# Patient Record
Sex: MALE | Race: BLACK OR AFRICAN AMERICAN | NOT HISPANIC OR LATINO | Employment: FULL TIME | URBAN - METROPOLITAN AREA
[De-identification: names, ages, dates, MRNs, and addresses within clinical notes are randomized per-mention and may not be internally consistent; named-entity substitution may affect disease eponyms.]

---

## 2023-12-05 DIAGNOSIS — Z00.00 HEALTH MAINTENANCE EXAMINATION: ICD-10-CM

## 2024-01-26 ENCOUNTER — HOSPITAL ENCOUNTER (OUTPATIENT)
Dept: VASCULAR MEDICINE | Facility: HOSPITAL | Age: 64
Discharge: HOME | End: 2024-01-26
Payer: COMMERCIAL

## 2024-01-26 ENCOUNTER — OFFICE VISIT (OUTPATIENT)
Dept: PRIMARY CARE | Facility: CLINIC | Age: 64
End: 2024-01-26
Payer: COMMERCIAL

## 2024-01-26 ENCOUNTER — HOSPITAL ENCOUNTER (OUTPATIENT)
Dept: RADIOLOGY | Facility: HOSPITAL | Age: 64
Discharge: HOME | End: 2024-01-26
Payer: COMMERCIAL

## 2024-01-26 ENCOUNTER — NUTRITION (OUTPATIENT)
Dept: PRIMARY CARE | Facility: CLINIC | Age: 64
End: 2024-01-26
Payer: COMMERCIAL

## 2024-01-26 ENCOUNTER — ALLIED HEALTH (OUTPATIENT)
Dept: INTEGRATIVE MEDICINE | Facility: CLINIC | Age: 64
End: 2024-01-26
Payer: COMMERCIAL

## 2024-01-26 ENCOUNTER — HOSPITAL ENCOUNTER (OUTPATIENT)
Dept: CARDIOLOGY | Facility: HOSPITAL | Age: 64
Discharge: HOME | End: 2024-01-26
Payer: COMMERCIAL

## 2024-01-26 VITALS
SYSTOLIC BLOOD PRESSURE: 116 MMHG | HEART RATE: 74 BPM | BODY MASS INDEX: 26.9 KG/M2 | HEIGHT: 73 IN | OXYGEN SATURATION: 97 % | WEIGHT: 203 LBS | DIASTOLIC BLOOD PRESSURE: 78 MMHG

## 2024-01-26 VITALS — HEIGHT: 73 IN | BODY MASS INDEX: 26.9 KG/M2 | WEIGHT: 203 LBS

## 2024-01-26 DIAGNOSIS — Z00.00 HEALTH MAINTENANCE EXAMINATION: ICD-10-CM

## 2024-01-26 DIAGNOSIS — Z00.00 HEALTH MAINTENANCE EXAMINATION: Primary | ICD-10-CM

## 2024-01-26 DIAGNOSIS — R09.89 OTHER SPECIFIED SYMPTOMS AND SIGNS INVOLVING THE CIRCULATORY AND RESPIRATORY SYSTEMS: ICD-10-CM

## 2024-01-26 DIAGNOSIS — Z00.00 HEALTHCARE MAINTENANCE: Primary | ICD-10-CM

## 2024-01-26 DIAGNOSIS — Z13.6 ENCOUNTER FOR SCREENING FOR CARDIOVASCULAR DISORDERS: ICD-10-CM

## 2024-01-26 LAB
25(OH)D3 SERPL-MCNC: 56 NG/ML (ref 30–100)
ALBUMIN SERPL BCP-MCNC: 4.3 G/DL (ref 3.4–5)
ALP SERPL-CCNC: 81 U/L (ref 33–136)
ALT SERPL W P-5'-P-CCNC: 30 U/L (ref 10–52)
ANION GAP SERPL CALC-SCNC: 12 MMOL/L (ref 10–20)
AST SERPL W P-5'-P-CCNC: 25 U/L (ref 9–39)
BASOPHILS # BLD AUTO: 0.03 X10*3/UL (ref 0–0.1)
BASOPHILS NFR BLD AUTO: 0.7 %
BILIRUB SERPL-MCNC: 0.8 MG/DL (ref 0–1.2)
BUN SERPL-MCNC: 15 MG/DL (ref 6–23)
CALCIUM SERPL-MCNC: 9.6 MG/DL (ref 8.6–10.3)
CHLORIDE SERPL-SCNC: 102 MMOL/L (ref 98–107)
CHOLEST SERPL-MCNC: 240 MG/DL (ref 0–199)
CHOLESTEROL/HDL RATIO: 4.6
CO2 SERPL-SCNC: 30 MMOL/L (ref 21–32)
CREAT SERPL-MCNC: 1.01 MG/DL (ref 0.5–1.3)
CRP SERPL HS-MCNC: 3 MG/L
EGFRCR SERPLBLD CKD-EPI 2021: 84 ML/MIN/1.73M*2
EOSINOPHIL # BLD AUTO: 0.12 X10*3/UL (ref 0–0.7)
EOSINOPHIL NFR BLD AUTO: 2.7 %
ERYTHROCYTE [DISTWIDTH] IN BLOOD BY AUTOMATED COUNT: 11.5 % (ref 11.5–14.5)
EST. AVERAGE GLUCOSE BLD GHB EST-MCNC: 91 MG/DL
FERRITIN SERPL-MCNC: 120 NG/ML (ref 20–300)
GLUCOSE SERPL-MCNC: 95 MG/DL (ref 74–99)
HBA1C MFR BLD: 4.8 %
HCT VFR BLD AUTO: 45.6 % (ref 41–52)
HDLC SERPL-MCNC: 52.1 MG/DL
HGB BLD-MCNC: 14.4 G/DL (ref 13.5–17.5)
IMM GRANULOCYTES # BLD AUTO: 0 X10*3/UL (ref 0–0.7)
IMM GRANULOCYTES NFR BLD AUTO: 0 % (ref 0–0.9)
INSULIN P FAST SERPL-ACNC: 10 UIU/ML (ref 3–25)
IRON SATN MFR SERPL: 36 % (ref 25–45)
IRON SERPL-MCNC: 133 UG/DL (ref 35–150)
LDLC SERPL CALC-MCNC: 166 MG/DL
LYMPHOCYTES # BLD AUTO: 2.41 X10*3/UL (ref 1.2–4.8)
LYMPHOCYTES NFR BLD AUTO: 54 %
MAGNESIUM SERPL-MCNC: 2.2 MG/DL (ref 1.6–2.4)
MCH RBC QN AUTO: 31.4 PG (ref 26–34)
MCHC RBC AUTO-ENTMCNC: 31.6 G/DL (ref 32–36)
MCV RBC AUTO: 100 FL (ref 80–100)
MONOCYTES # BLD AUTO: 0.44 X10*3/UL (ref 0.1–1)
MONOCYTES NFR BLD AUTO: 9.9 %
NEUTROPHILS # BLD AUTO: 1.46 X10*3/UL (ref 1.2–7.7)
NEUTROPHILS NFR BLD AUTO: 32.7 %
NON HDL CHOLESTEROL: 188 MG/DL (ref 0–149)
NRBC BLD-RTO: 0 /100 WBCS (ref 0–0)
PLATELET # BLD AUTO: 332 X10*3/UL (ref 150–450)
POTASSIUM SERPL-SCNC: 5 MMOL/L (ref 3.5–5.3)
PROT SERPL-MCNC: 7.8 G/DL (ref 6.4–8.2)
PSA SERPL-MCNC: 1.27 NG/ML
RBC # BLD AUTO: 4.58 X10*6/UL (ref 4.5–5.9)
SODIUM SERPL-SCNC: 139 MMOL/L (ref 136–145)
TIBC SERPL-MCNC: 367 UG/DL (ref 240–445)
TRIGL SERPL-MCNC: 109 MG/DL (ref 0–149)
TSH SERPL-ACNC: 2.78 MIU/L (ref 0.44–3.98)
UIBC SERPL-MCNC: 234 UG/DL (ref 110–370)
URATE SERPL-MCNC: 4.4 MG/DL (ref 4–7.5)
VIT B12 SERPL-MCNC: 698 PG/ML (ref 211–911)
VLDL: 22 MG/DL (ref 0–40)
WBC # BLD AUTO: 4.5 X10*3/UL (ref 4.4–11.3)

## 2024-01-26 PROCEDURE — 82728 ASSAY OF FERRITIN: CPT

## 2024-01-26 PROCEDURE — 93016 CV STRESS TEST SUPVJ ONLY: CPT | Performed by: INTERNAL MEDICINE

## 2024-01-26 PROCEDURE — SMAR2 SMART-UHCSM: Performed by: PHYSICIAN ASSISTANT

## 2024-01-26 PROCEDURE — 83525 ASSAY OF INSULIN: CPT

## 2024-01-26 PROCEDURE — 71046 X-RAY EXAM CHEST 2 VIEWS: CPT | Mod: FOREIGN READ | Performed by: RADIOLOGY

## 2024-01-26 PROCEDURE — 75571 CT HRT W/O DYE W/CA TEST: CPT

## 2024-01-26 PROCEDURE — 83540 ASSAY OF IRON: CPT

## 2024-01-26 PROCEDURE — 83550 IRON BINDING TEST: CPT

## 2024-01-26 PROCEDURE — 93880 EXTRACRANIAL BILAT STUDY: CPT | Mod: 52

## 2024-01-26 PROCEDURE — 76706 US ABDL AORTA SCREEN AAA: CPT

## 2024-01-26 PROCEDURE — 86141 C-REACTIVE PROTEIN HS: CPT

## 2024-01-26 PROCEDURE — 84443 ASSAY THYROID STIM HORMONE: CPT

## 2024-01-26 PROCEDURE — 85025 COMPLETE CBC W/AUTO DIFF WBC: CPT

## 2024-01-26 PROCEDURE — 84550 ASSAY OF BLOOD/URIC ACID: CPT

## 2024-01-26 PROCEDURE — 82172 ASSAY OF APOLIPOPROTEIN: CPT

## 2024-01-26 PROCEDURE — 76706 US ABDL AORTA SCREEN AAA: CPT | Performed by: INTERNAL MEDICINE

## 2024-01-26 PROCEDURE — 83036 HEMOGLOBIN GLYCOSYLATED A1C: CPT

## 2024-01-26 PROCEDURE — 82607 VITAMIN B-12: CPT

## 2024-01-26 PROCEDURE — 83735 ASSAY OF MAGNESIUM: CPT

## 2024-01-26 PROCEDURE — 84402 ASSAY OF FREE TESTOSTERONE: CPT

## 2024-01-26 PROCEDURE — 93017 CV STRESS TEST TRACING ONLY: CPT

## 2024-01-26 PROCEDURE — 71046 X-RAY EXAM CHEST 2 VIEWS: CPT

## 2024-01-26 PROCEDURE — 36415 COLL VENOUS BLD VENIPUNCTURE: CPT

## 2024-01-26 PROCEDURE — 84153 ASSAY OF PSA TOTAL: CPT

## 2024-01-26 PROCEDURE — 93880 EXTRACRANIAL BILAT STUDY: CPT | Mod: REDUCED SERVICES | Performed by: INTERNAL MEDICINE

## 2024-01-26 PROCEDURE — EXAM4 EXAM 4: Performed by: INTERNAL MEDICINE

## 2024-01-26 PROCEDURE — 80061 LIPID PANEL: CPT

## 2024-01-26 PROCEDURE — 93018 CV STRESS TEST I&R ONLY: CPT | Performed by: INTERNAL MEDICINE

## 2024-01-26 PROCEDURE — NUTCO NUTRITION CONSULTATION: Performed by: DIETITIAN, REGISTERED

## 2024-01-26 PROCEDURE — 82306 VITAMIN D 25 HYDROXY: CPT

## 2024-01-26 PROCEDURE — 80053 COMPREHEN METABOLIC PANEL: CPT

## 2024-01-26 RX ORDER — PHENYLEPHRINE HCL 10 MG
500 TABLET ORAL DAILY
COMMUNITY

## 2024-01-26 RX ORDER — TURMERIC/TURMERIC EXT/PEPR EXT 900-100 MG
CAPSULE ORAL
COMMUNITY

## 2024-01-26 RX ORDER — VIT C/E/ZN/COPPR/LUTEIN/ZEAXAN 250MG-90MG
CAPSULE ORAL
COMMUNITY

## 2024-01-26 RX ORDER — IBUPROFEN 100 MG/5ML
SUSPENSION, ORAL (FINAL DOSE FORM) ORAL
COMMUNITY

## 2024-01-26 RX ORDER — LANOLIN ALCOHOL/MO/W.PET/CERES
CREAM (GRAM) TOPICAL
COMMUNITY

## 2024-01-26 RX ORDER — BETA-CAROTENE 7500 MCG
CAPSULE ORAL
COMMUNITY

## 2024-01-26 RX ORDER — EPINEPHRINE 0.22MG
100 AEROSOL WITH ADAPTER (ML) INHALATION
COMMUNITY

## 2024-01-26 NOTE — PATIENT INSTRUCTIONS
1) To promote weight loss, which should also keep lipid profile and glucose tolerance in line, try to stick to a 8164-3391 calorie a day meal plan.     2) To help lower cholesterol levels, begin to prioritize your fiber intake and aim for a goal of 35-40g fiber per day.  Be sure to include a wide variety of vegetables each day, at both lunch and dinner.  Other great high fiber foods include beans, lentils, berries, michoacano seeds, and flax seeds.    3) Incorporate foods high in soluble fiber such as: black beans, kidney beans, [place] sprouts, sweet potatoes, broccoli, turnips, carrots, avocado, pears, apricots, nectarines, apples, flax seeds, sunflower seeds, hazelnuts, oats.  Aim for 10-15g soluble fiber per day.    4) Instead of the ONE protein bar, consider switching to plant based bars such as Aloha or No Cow which are both high in protein and fiber with clean ingredient lists.    5) When making oatmeal for dinner, be sure to add a 1.5-2 scoops of protein powder such as Ka'Javier, Sultan, OWYN, or Garden of Life.  When making eggs for dinner, combine these with a variety of vegetables and top with avocado for added fiber and healthy fats.    6) Recommended supplements:  -Pure Encapsulations O.N.E. Multivitamin  -Nordic Naturals Algae Omega  -Pure Encapsulations magnesium citrate (to help with constipation), can take up to 500mg at bedtime    7) Consider using pre-made meals that can be delivered to your home to offer more variety in your diet.  Recommended companies: Unrefined MALINDA, Factor, Trifecta.  All of these options offer vegetarian/vegan meal options.

## 2024-01-26 NOTE — PROGRESS NOTES
"Nutrition Assessment     Reason for Visit: It was a pleasure speaking with Mr. Castano today to discuss diet and nutrition as part of his executive physical follow-up.      Anthropometrics:  Anthropometrics  Height: 185.4 cm (6' 1\")  Weight: 92.1 kg (203 lb)  BMI (Calculated): 26.79  DBW: 180lbs     Food And Nutrient Intake:  Food and Nutrient History  Food and Nutrient History: He follows a vegetarian diet.  His protein sources include tofu, beans, lentils, eggs 1-2 times a week,  He reports eating 2 meals a day.  He generally skips breakfast.  He is not usually hungry at that time and is \"too busy\" to eat this meal.  He gets his lunch from the cafeteria at work during the work week.  He either gets soup-lentil, vegetable, tomato, or a salad from the salad bar that includes tofu, nuts, sometimes egg, carrots, banana peppers, balsamic vinaigrette dressing. On the weekends he may just have pretzels and a diet coke for lunch.  Sometimes he snacks on pretzels in the afternoon. Sometimes he will incorporate a ONE protein bar in the afternoon.  He may include a second ONE protein bar around dinner time (as part of dinner or after dinner).  His typical dinner is a bowl of oatmeal or eggs. If he goes out to eat for dinner he will order salads.  Food Allergy: None  Food Intolerance: None  GI Symptoms: constipation (Since he got back from [facility] he has not been as consistent with his bowel movements.)  Sleep Duration/Quality:  (7 hours a night.  No issues falling asleep.  He gets up to use the bathroom 1-2 times a night.  He falls back to sleep easily.)     Supplements: magnesium, turmeric, Vitamin D, Vitamin C, cinnamon, multivitamin, CoQ10    24 Hour Food Recall  B: skipped  L: salad-tofu, sunflower seeds, iceberg lettuce, carrots, hardboiled egg, banana peppers, balsamic vinaigrette dressing  S: ONE protein bar  D: work function-potatoes, asparagus, vegetables in a flaky crust  S: ONE protein bar    Physical " "Activities:  Physical Activity  Physical Activity History: About 4-5 months ago, he has been experiencing more knee pain where it was even difficult to walk.  He stopped working out to let it recover.  Type of Physical Activity: He just got back to exercising a week ago.  3-4 days a week, 1 hour of cardio, some strength training ~20-30 minutes.    Energy Needs  Calculated Energy Needs Using Equations  Height: 185.4 cm (6' 1\")  Weight Used for Equation Calculations: 92.1 kg (203 lb)  [employer] Equation (Overweight or Obese Patients): 1770  Equation Chosen to Use by RD: Amiato  Activity Factor: 1.4  Total Energy Needs: 2481  Calories for weight loss: 7832-7074  Protein Needs: 125g/day (0.7g/lb DBW, 180lbs)    Nutrition Diagnosis      Nutrition Diagnosis  Patient has Nutrition Diagnosis: Yes  Diagnosis Status (1): New  Nutrition Diagnosis 1: Altered nutrition related to laboratory values  Related to (1): dietary and physiological factors  As Evidenced by (1): elevated cholesterol (240mg/dl), elevated LDL cholesterol (166mg/dl)  Additional Nutrition Diagnosis: Diagnosis 3  Diagnosis Status (2): New  Nutrition Diagnosis 2: Inadequate fiber intake  Related to (2): food- and nutrition-related knowledge deficit regarding appropriate fiber intake  As Evidenced by (2): food recall showing he is not meeting the recommended 35-40g fiber per day  Diagnosis Status (3): New  Nutrition Diagnosis 3: Inadequate protein intake  Related to (3): food- and nutrition-related knowledge deficit regarding appropriate protein intake  As Evidenced by (3): food recall showing he is not meeting the recommended 125g protein per day    Nutrition Interventions/Recommendations   Food and Nutrition Delivery  Meals & Snacks: Energy-modified diet, Fiber-modified diet, Protein-modified diet        Patient Instructions   1) To promote weight loss, which should also keep lipid profile and glucose tolerance in line, try to stick to a " 1544-3541 calorie a day meal plan.     2) To help lower cholesterol levels, begin to prioritize your fiber intake and aim for a goal of 35-40g fiber per day.  Be sure to include a wide variety of vegetables each day, at both lunch and dinner.  Other great high fiber foods include beans, lentils, berries, michoacano seeds, and flax seeds.    3) Incorporate foods high in soluble fiber such as: black beans, kidney beans, [place] sprouts, sweet potatoes, broccoli, turnips, carrots, avocado, pears, apricots, nectarines, apples, flax seeds, sunflower seeds, hazelnuts, oats.  Aim for 10-15g soluble fiber per day.    4) Instead of the ONE protein bar, consider switching to plant based bars such as Aloha or No Cow which are both high in protein and fiber with clean ingredient lists.    5) When making oatmeal for dinner, be sure to add a 1.5-2 scoops of protein powder such as Ka'Javier, [place], OWYN, or Garden of Life.  When making eggs for dinner, combine these with a variety of vegetables and top with avocado for added fiber and healthy fats.    6) Recommended supplements:  -Pure Encapsulations O.N.E. Multivitamin  -Nordic Naturals Algae Omega  -Pure Encapsulations magnesium citrate (to help with constipation), can take up to 500mg at bedtime    7) Consider using pre-made meals that can be delivered to your home to offer more variety in your diet.  Recommended companies: Tip NetworkfinTalentSpring, Factor, Welliko.  All of these options offer vegetarian/vegan meal options.    Nutrition Monitoring and Evaluation   Food/Nutrient Related History Monitoring  Monitoring and Evaluation Plan: Energy intake, Protein intake, Fiber intake  Energy Intake: Estimated energy intake  Criteria: 7354-3970 calories a day for weight loss  Estimated protein intake: Estimated protein intake  Criteria: 125g protein per day  Estimated fiber intake: Estimated fiber intake  Criteria: 35-40g fiber per day  Biochemical Data, Medical Tests and Procedures  Monitoring and  Evaluation Plan: Lipid profile

## 2024-01-26 NOTE — PROGRESS NOTES
"64 y/o male presents to [facility] for stress management and resilience training in coordination with  Executive Health. Lives with partner, Steve. Has 4 children (41, 40, 32, 24)- live in [place].    - Schedule and workload unchanged since last visit  - Maintaining health in body, mind, and spirit  - Has been more mindful with team meetings, incorporating humor  - Plans to retire at age 65- no concerns about this transition  - Learning to speak [place] with family   - Stress is well-managed with lifestyle balance, mindset, socialization, and creativity    Congratulated on success with work/life integration!  Provided few book recommendations.            RECALL 5/2022:   Duties/Schedule:  of [employer] x 6 years. Primarily back to back meetings- customer, tech review, labor review. Office by 9am, finishes by 630/7pm. Commute 30 minutes. Traveling- domestic and international, ~50% of the month- [place] last week visiting manufacturing sites; also visits tech centers, suppliers, customers. \"Every day is different but also the same.\"     Time for self-care: finds time to decompress, listening to music/news. Enjoys working out, socializing with friends, going to the theater, live music, playing cards.      Known stressors: work- pandemic, supply chain issues, labor issues, customer complaints. Tries to encourage team members to be better leaders.      Stress in the body: rarely headaches; anger when people are not making an effort; stress energizes him; less patience; focus increases when under stress      Sleep quality: averages 7-8 hours per night, no issues with onset or maintenance     Current stress management strategies: listens to music, focus on what he can control, perspective; problem solving     Discussed physiologic changes that occur with acute vs. chronic stress, the autonomic nervous system, evidence re: neuroplasticity of mindfulness, and different mindfulness practices. Also discussed lifestyle habits that support " "the Body's natural defense mechanisms.  --- Tried breath work, did not find it effective.         Plan:   Fitmind meditation   Calm krystal  Mindfulness handout  Positivity statement- \"loses authenticity\" when positive statement is followed by the negatives   "

## 2024-01-26 NOTE — PROGRESS NOTES
Executive Physical         Patient ID: Darrius Castano is a 63 y.o. male who presents for Executive Evaluation:    The following report is in reference to your  executive physical examination which was held at [facility] on 01/26/24. Firstly, let me state that it was a pleasure meeting with you and that we appreciate you coming to [facility] for your executive evaluation.    At the time of your evaluation you were feeling well with no significant health concerns.    You were not complaining of fever ,chills, headache ,dizziness ,cough, chest pain shortness of breath, palpitations, nausea, vomiting, abdominal pain, loss of appetite, diarrhea, blood in the stools, frequent urination or painful urination.    Past Medical History:   Diagnosis Date    COVID-19     COVID-19 virus infection         Review of Systems   Constitutional: Negative.    HENT: Negative.     Eyes: Negative.    Respiratory: Negative.     Cardiovascular: Negative.    Gastrointestinal: Negative.    Endocrine: Negative.    Genitourinary: Negative.    Skin: Negative.    Allergic/Immunologic: Negative.    Neurological: Negative.    Hematological: Negative.    Psychiatric/Behavioral: Negative.     All other systems reviewed and are negative.        Past Surgical History:   Procedure Laterality Date    US GUIDED THYROID BIOPSY  6/1/2020    US GUIDED THYROID BIOPSY 6/1/2020 [facility]         No Known Allergies       Current Outpatient Medications:     ascorbic acid (Vitamin C) 1,000 mg tablet, Take by mouth., Disp: , Rfl:     cholecalciferol (Vitamin D-3) 25 MCG (1000 UT) capsule, Take by mouth once daily., Disp: , Rfl:     Cinnamon 500 mg capsule, Take 1 capsule (500 mg) by mouth once daily., Disp: , Rfl:     coenzyme Q-10 100 mg capsule, Take 1 capsule (100 mg) by mouth., Disp: , Rfl:     cyanocobalamin (Vitamin B-12) 1,000 mcg tablet, Take by mouth., Disp: , Rfl:     L.  "acidophilus/Bifid. animalis 32 billion cell capsule, Take by mouth., Disp: , Rfl:     saw palmetto-zinc picolinate 450-15 mg capsule, Take by mouth., Disp: , Rfl:     turmeric/turmeric ext/pepr ext (turmeric-turmeric ext-pepper) 900-100-5 mg capsule, Take by mouth., Disp: , Rfl:      Visit Vitals  /78   Pulse 74   Ht 1.854 m (6' 1\")   Wt 92.1 kg (203 lb)   SpO2 97%   BMI 26.78 kg/m²   Smoking Status Never   BSA 2.18 m²        Physical Exam  Constitutional:       Appearance: Normal appearance.   HENT:      Head: Normocephalic and atraumatic.      Right Ear: Tympanic membrane, ear canal and external ear normal.      Left Ear: Tympanic membrane, ear canal and external ear normal.      Nose: Nose normal.      Mouth/Throat:      Mouth: Mucous membranes are moist.   Eyes:      Extraocular Movements: Extraocular movements intact.      Conjunctiva/sclera: Conjunctivae normal.      Pupils: Pupils are equal, round, and reactive to light.   Cardiovascular:      Rate and Rhythm: Normal rate and regular rhythm.      Pulses: Normal pulses.      Heart sounds: Normal heart sounds.   Pulmonary:      Effort: Pulmonary effort is normal.      Breath sounds: Normal breath sounds.   Abdominal:      General: Abdomen is flat. Bowel sounds are normal.      Palpations: Abdomen is soft.   Genitourinary:     Rectum: Normal.   Musculoskeletal:         General: Normal range of motion.      Cervical back: Normal range of motion.   Skin:     General: Skin is warm.   Neurological:      General: No focal deficit present.      Mental Status: He is alert and oriented to person, place, and time.   Psychiatric:         Mood and Affect: Mood normal.         Behavior: Behavior normal.       Discussion/Summary  In summary you are 63 y.o. male  with a no past medical history of note.  At the time of your evaluation you were feeling well with no significant health concerns.  Past Medical History:   Diagnosis Date    COVID-19     COVID-19 virus " infection     The Physical examination, including blood pressure and cardiovascular examination was unremarkable.     Lab studies including complete blood count, comprehensive metabolic panel,  thyroid function, prostate specific antigen (PSA), Vitamin D, testosterone levels and inflammatory markers were normal.      Elevated Total+ LDL-cholesterol levels    Cardiology- Normal EKG, CT-Cardiac score, Exercise stress test, Carotid and Abdominal Aorta ultrasound studies.   The 10-year coronary artery disease risk is 8% which is a moderate  risk for heart disease and strokes.    Elevated Total+ LDL-cholesterol levels-  Increase protein/fish/fiber intake and limit processed/refined carbohydrates and added sugars. Increased physical activity to a minimum of 150 minutes of moderate exercise per week.Consider a low dose statin ( Atorvastatin or Rosuvastatin) to lower cholesterol levels and heart disease risk by 30%.    Thyroid Lump- Prior thyroid biopsy was benign. Recommend follow up Thyroid ultrasound if lump appears to be increasing or any discomfort ensues.     Skin - Please follow up with dermatology for annual examination.Please use a broad-spectrum sunscreen with an SPF of 30 or higher. Monitor moles for ABCDE ( asymmetry, border irregularity, color changes, diameter> pencil eraser and evolving )    Cancer screening- you are current with colonoscopy,prostate and lung cancer screening tests.     Vaccine-I would  recommend a shingles (Shingrix) vaccine if not yet completed..  In addition I recommend a tetanus booster every 10 years to maintain immunity.  Recommend pneumonia vaccine (Prevnar 20) at age  65.  Consider COVID-19 booster, RSV and flu shot next  fall.      Wellness : Please continue with a balanced diet and a regular physical activity program for at least 150 minutes/week of moderate exercise and 30 minutes/week of resistance/weight training per week.  Try to get 6 to 8 hours of sleep per night.  Please  download the calm or headspace sujata from the Sujata Store to assist with stress and sleep management if necessary.    In conclusion,  I wish to thank you for attending the  executive health program at [facility].  I wish you and your family a safe and healthy Spring  season.    Please email me at [email or web address] or call me at [phone] if you have any questions pertaining to this report or any other medical concerns.    Be Well    Dr JOSE CARLOS James and Africa Barker Master Clinician in Wellness    Senior Attending Physician , [facility]    [facility]    Clinical     [employer]    [place], [place]    This note was partially generated using the Dragon voice recognition system.  There may be some incorrect wording ,grammar, spelling or punctuation errors that were not corrected prior to committing the note to the medical record.

## 2024-01-28 LAB — LPA SERPL-MCNC: 23 MG/DL

## 2024-01-29 NOTE — PATIENT INSTRUCTIONS
Keep up the good work with work/life integration!   Books:  Limitless by Freddie Lamb   Your Next Season: Advice for Executives on Transitioning from Intense Careers to Fulfilling Next Seasons by Dinorah Jacobsen Ph.D. Roscoe Montgomery.  The 6 Types of Working Genius: A Better Way to Understand Your Gifts, Your Frustrations, and Your Team Hardcover - by Adolfo Waldron   Smart Phone Apps:  FitMind- 30 day introduction to meditation  One Sec- this krystal promotes reduction in screen use  Podcast: Feel Better, Liver More with Dr. Valdemar Marquis

## 2024-01-31 LAB
TESTOSTERONE FREE (CHAN): 66.6 PG/ML (ref 35–155)
TESTOSTERONE,TOTAL,LC-MS/MS: 416 NG/DL (ref 250–1100)

## 2024-03-10 ASSESSMENT — ENCOUNTER SYMPTOMS
HEMATOLOGIC/LYMPHATIC NEGATIVE: 1
CONSTITUTIONAL NEGATIVE: 1
RESPIRATORY NEGATIVE: 1
ALLERGIC/IMMUNOLOGIC NEGATIVE: 1
PSYCHIATRIC NEGATIVE: 1
CARDIOVASCULAR NEGATIVE: 1
GASTROINTESTINAL NEGATIVE: 1
ENDOCRINE NEGATIVE: 1
EYES NEGATIVE: 1
NEUROLOGICAL NEGATIVE: 1

## 2024-05-20 DIAGNOSIS — Z23: Primary | ICD-10-CM

## 2024-05-20 RX ORDER — ATOVAQUONE AND PROGUANIL HYDROCHLORIDE 250; 100 MG/1; MG/1
1 TABLET, FILM COATED ORAL DAILY
Qty: 14 TABLET | Refills: 0 | Status: SHIPPED | OUTPATIENT
Start: 2024-05-20 | End: 2024-06-03

## 2024-09-27 ENCOUNTER — HOSPITAL ENCOUNTER (OUTPATIENT)
Dept: RADIOLOGY | Facility: HOSPITAL | Age: 64
Discharge: HOME | End: 2024-09-27
Payer: COMMERCIAL

## 2024-09-27 ENCOUNTER — OFFICE VISIT (OUTPATIENT)
Dept: ORTHOPEDIC SURGERY | Facility: HOSPITAL | Age: 64
End: 2024-09-27
Payer: COMMERCIAL

## 2024-09-27 VITALS — HEIGHT: 73 IN | WEIGHT: 205 LBS | BODY MASS INDEX: 27.17 KG/M2

## 2024-09-27 DIAGNOSIS — M25.562 PAIN IN BOTH KNEES, UNSPECIFIED CHRONICITY: Primary | ICD-10-CM

## 2024-09-27 DIAGNOSIS — M25.561 PAIN IN BOTH KNEES, UNSPECIFIED CHRONICITY: ICD-10-CM

## 2024-09-27 DIAGNOSIS — M25.562 PAIN IN BOTH KNEES, UNSPECIFIED CHRONICITY: ICD-10-CM

## 2024-09-27 DIAGNOSIS — M17.12 ARTHRITIS OF LEFT KNEE: ICD-10-CM

## 2024-09-27 DIAGNOSIS — M17.11 ARTHRITIS OF KNEE, RIGHT: ICD-10-CM

## 2024-09-27 DIAGNOSIS — M25.561 PAIN IN BOTH KNEES, UNSPECIFIED CHRONICITY: Primary | ICD-10-CM

## 2024-09-27 PROCEDURE — 99213 OFFICE O/P EST LOW 20 MIN: CPT | Performed by: ORTHOPAEDIC SURGERY

## 2024-09-27 PROCEDURE — 1036F TOBACCO NON-USER: CPT | Performed by: ORTHOPAEDIC SURGERY

## 2024-09-27 PROCEDURE — 73560 X-RAY EXAM OF KNEE 1 OR 2: CPT | Mod: LT

## 2024-09-27 PROCEDURE — 73562 X-RAY EXAM OF KNEE 3: CPT | Mod: RT

## 2024-09-27 PROCEDURE — 99203 OFFICE O/P NEW LOW 30 MIN: CPT | Performed by: ORTHOPAEDIC SURGERY

## 2024-09-27 PROCEDURE — 3008F BODY MASS INDEX DOCD: CPT | Performed by: ORTHOPAEDIC SURGERY

## 2024-09-27 ASSESSMENT — PAIN - FUNCTIONAL ASSESSMENT: PAIN_FUNCTIONAL_ASSESSMENT: NO/DENIES PAIN

## 2024-09-27 NOTE — PROGRESS NOTES
PRIMARY CARE PHYSICIAN: Maged Stewart MD  REFERRING PROVIDER: No referring provider defined for this encounter.     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  No specific trauma.  He notes pain particularly after exercise and some aching at night.  He has discontinued running but has continued with walking on the treadmill and regular weight lifting.  The right knee pain is primarily lateral and occasionally patellofemoral.  There are palpable osteophytes and occasional swelling.  The left knee pain is primarily medial.  No mechanical catching or giving way in either knee.  He notes this is becoming more of a nuisance.  Several years ago he had a right knee aspiration of Baker's cyst performed.  No other knee injection or surgery.  He has attempted activity modifications.  Of note, he is now experiencing some bilateral hip tightness as a result.      REVIEW OF SYSTEMS  Constitutional: See HPI for pain assessment, No significant weight loss, recent trauma  Cardiovascular: No chest pain, shortness of breath  Respiratory: No difficulty breathing, cough  Gastrointestinal: No nausea, vomiting, diarrhea, constipation  Musculoskeletal: Noted in HPI, positive for pain, restricted motion, stiffness  Integumentary: No rashes, easy bruising, redness   Neurological: no numbness or tingling in extremities, no gait disturbances   Psychiatric: No mood changes, memory changes, social issues  Heme/Lymph: no excessive swelling, easy bruising, excessive bleeding  ENT: No hearing changes  Eyes: No vision changes    Past Medical History:   Diagnosis Date    COVID-19     COVID-19 virus infection        No Known Allergies     Past Surgical History:   Procedure Laterality Date     US GUIDED THYROID BIOPSY  2020    US GUIDED THYROID BIOPSY 2020 [facility]        No family history on file.     Social History     Socioeconomic History    Marital status:      Spouse name: Not on file    Number of children: Not on file    Years of education: Not on file    Highest education level: Not on file   Occupational History    Not on file   Tobacco Use    Smoking status: Never    Smokeless tobacco: Never   Vaping Use    Vaping status: Never Used   Substance and Sexual Activity    Alcohol use: Yes     Comment: Very rare occasions    Drug use: Not on file    Sexual activity: Not on file   Other Topics Concern    Not on file   Social History Narrative    Not on file     Social Determinants of Health     Financial Resource Strain: Not on File (2021)    Received from [email or web address]    Financial Resource Strain     Financial Resource Strain: 0   Food Insecurity: Not on File (2021)    Received from [facility]    Food Insecurity     Food: 0   Transportation Needs: Not on File (2021)    Received from [facility]    Transportation Needs     Transportation: 0   Physical Activity: Not on File (2021)    Received from [email or web address]    Physical Activity     Physical Activity: 0   Stress: Not on File (2021)    Received from [facility]    Stress     Stress: 0   Social Connections: Not on File (2021)    Received from [employer]    Social Connections     Social Connections and Isolation: 0   Intimate Partner Violence: Low Risk  (2021)    Received from [employer]    Intimate Partner Violence     Insults You: Not on file     Threatens You: Not on file     Screams at You: Not on file     Physically Hurt: Not on file     Intimate Partner Violence Score: Not on file   Housing Stability: Not on File (2021)    Received from [facility]    Housing Stability     Housin        CURRENT MEDICATIONS:   Current Outpatient Medications   Medication Sig  "Dispense Refill    ascorbic acid (Vitamin C) 1,000 mg tablet Take by mouth.      cholecalciferol (Vitamin D-3) 25 MCG (1000 UT) capsule Take by mouth once daily.      Cinnamon 500 mg capsule Take 1 capsule (500 mg) by mouth once daily.      coenzyme Q-10 100 mg capsule Take 1 capsule (100 mg) by mouth.      cyanocobalamin (Vitamin B-12) 1,000 mcg tablet Take by mouth.      L. acidophilus/Bifid. animalis 32 billion cell capsule Take by mouth.      saw palmetto-zinc picolinate 450-15 mg capsule Take by mouth.      turmeric/turmeric ext/pepr ext (turmeric-turmeric ext-pepper) 900-100-5 mg capsule Take by mouth.       No current facility-administered medications for this visit.        OBJECTIVE  PHYSICAL EXAM      5/22/2020     1:10 PM 3/1/2021     4:24 PM 5/16/2022    10:14 AM 1/26/2024     9:45 AM 1/26/2024    12:50 PM 1/26/2024    12:51 PM 9/27/2024    10:14 AM   Vitals   Systolic  140 124 116      Diastolic  88 82 78      Heart Rate  66 71 74      Height (in) 1.854 m (6' 1\") 1.854 m (6' 1\") 1.842 m (6' 0.5\") 1.854 m (6' 1\") 1.854 m (6' 1\") 1.854 m (6' 1\") 1.854 m (6' 1\")   Weight (lb) 195 198 202 203 203  205   BMI 25.73 kg/m2 26.12 kg/m2 27.02 kg/m2 26.78 kg/m2 26.78 kg/m2 26.78 kg/m2 27.05 kg/m2   BSA (m2) 2.14 m2 2.15 m2 2.16 m2 2.18 m2 2.18 m2 2.18 m2 2.19 m2   Visit Report    Report Report Report Report      Body mass index is 27.05 kg/m².    64 y.o. year-old in no acute distress. Well nourished. Normal affect. Alert and oriented x 3.   Gait: Normal Tandem. Neutral alignment. Able to perform single leg stance. No abnormalities of balance or coordination.  Skin: Intact over the bilateral upper and lower extremities. No erythema, ecchymosis, or temperature changes.  Negative bilateral popliteal lymphadenopathy.  Hips: Painless ROM in all planes bilaterally. No tenderness to palpation.  He does note tightness over the iliotibial band.  Right Knee:  ROM: 0-130 degrees.  Positive patellofemoral crepitus.  No " effusion.  Palpable osteophytes.  Good quadriceps contraction. Intact extensor mechanism. Patellar tendon non-tender.  Patella facets non-tender.   Lachman stable. Posterior drawer negative.  Stable medial collateral ligament. Stable lateral collateral ligament.   Negative medial joint line tenderness.  Negative Sherrell's.  Positive lateral joint line tenderness.  Negative Sherrell's.     Left Knee:  ROM: 0-140 degrees.  Positive patellofemoral crepitus.  No effusion.  Palpable osteophytes.  Good quadriceps contraction. Intact extensor mechanism. Patellar tendon non-tender.  Patella facets non-tender. Lachman stable. Posterior drawer negative.  Stable medial collateral ligament. Stable lateral collateral ligament.   Positive medial joint line tenderness.  Negative Sherrell's.  Negative lateral joint line tenderness.  Negative Sherrell's.     Motor Strength: 5 out of 5 in the bilateral lower extremities.  Neuro: L4-S1 sensation intact grossly bilaterally. No clonus. 2+ and symmetric Patella and Achilles bilateral reflexes.  Vascular: 2+ DP/PT pulses bilaterally. Bilateral lower extremity compartments supple.    Imagin views that were weightbearing were performed of the bilateral knees today.  Right knee with moderate to severe lateral compartment narrowing particularly in the flexed knee view.  Hypertrophic osteophytes in the lateral and patellofemoral compartments.  Left knee with moderate to severe medial compartment narrowing in the flexed knee view.  Also with hypertrophic osteophyte in the patellofemoral compartment.      ASSESSMENT & PLAN    Impression: 64 y.o. male with bilateral knee symptomatic degenerative joint disease.    Plan:   I explained to the patient the nature of their diagnosis.  I reviewed the images with him.    We discussed a conservative treatment program focusing on physical therapy to maximize his knee function, hip flexibility and safe weightlifting program.    We reviewed various knee  injection options.  He would like to defer a knee steroid injection.  He would like to pursue bilateral knee viscosupplementation and PRP injections to treat his symptomatic arthritis.  I will help arrange these to be performed under ultrasound.    Ultimately degenerative changes may continue to progress over time with future consideration for arthroplasty.    At the end of the visit, all questions were answered in full. The patient is in agreement with the plan and recommendations. They will call the office with any questions/concerns.    Note dictated with [employer] software. Completed without full typed error editing and sent to avoid delay.

## 2024-10-29 ENCOUNTER — OFFICE VISIT (OUTPATIENT)
Dept: ORTHOPEDIC SURGERY | Facility: HOSPITAL | Age: 64
End: 2024-10-29
Payer: COMMERCIAL

## 2024-10-29 ENCOUNTER — HOSPITAL ENCOUNTER (OUTPATIENT)
Dept: RADIOLOGY | Facility: EXTERNAL LOCATION | Age: 64
Discharge: HOME | End: 2024-10-29

## 2024-10-29 DIAGNOSIS — M17.0 PRIMARY OSTEOARTHRITIS OF BOTH KNEES: Primary | ICD-10-CM

## 2024-10-29 DIAGNOSIS — M17.0 PRIMARY OSTEOARTHRITIS OF BOTH KNEES: ICD-10-CM

## 2024-10-29 PROCEDURE — 0232T NJX PLATELET PLASMA: CPT | Mod: XU | Performed by: FAMILY MEDICINE

## 2024-10-29 PROCEDURE — 20611 DRAIN/INJ JOINT/BURSA W/US: CPT | Mod: 50 | Performed by: FAMILY MEDICINE

## 2024-10-29 PROCEDURE — 0232T NJX PLATELET PLASMA: CPT | Performed by: FAMILY MEDICINE

## 2024-10-29 PROCEDURE — 2500000004 HC RX 250 GENERAL PHARMACY W/ HCPCS (ALT 636 FOR OP/ED): Mod: JZ | Performed by: FAMILY MEDICINE

## 2024-12-10 ENCOUNTER — OFFICE VISIT (OUTPATIENT)
Dept: ORTHOPEDIC SURGERY | Facility: HOSPITAL | Age: 64
End: 2024-12-10
Payer: COMMERCIAL

## 2024-12-10 DIAGNOSIS — M17.0 PRIMARY OSTEOARTHRITIS OF BOTH KNEES: Primary | ICD-10-CM

## 2024-12-10 PROCEDURE — 99213 OFFICE O/P EST LOW 20 MIN: CPT | Performed by: FAMILY MEDICINE

## 2024-12-10 NOTE — PROGRESS NOTES
Patient ID: Darrius Castano is a 64 y.o. male.    Sports Medicine Office Note    Today's Date:  12/10/2024     HPI: Darrius Castano is a 64 y.o. 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  He is seen upon referral by Dr. Vaz.    10/29/2024, he presents for evaluation of chronic bilateral knee pain, right worse than left.  He denies injury or trauma but has been a runner for many years.  He gets pain after exercise and occasionally aching at nighttime.  He had to discontinue running because of the pain.  He continues activities on the treadmill and weightlifting.  Most of his right knee pain is along the lateral joint line or around the patella.  He gets occasional swelling.  The left knee is more medial than the right.  He has had aspiration of Baker's cyst in the past but no other injectable treatments.  He is never had PRP or HA.  He is trying to hold off surgery as long as possible.  He denies recent injury or trauma.  Please see Dr. Vaz's note for more detail.  We agreed to start Ruddy/PRP treatment today. We were successful obtaining blood specimen out of the right antecubital fossa. PRP was injected into the right and left knees without any complications. We reviewed the post procedure protocol including nonweightbearing, crutch assisted ambulation for 2 days and then progressing off the crutches as tolerated. The patient will continue with NSAIDs for breakthrough pain; and will follow-up in 6 weeks for recheck.    Today, 12/10/2024, he returns for 6-week follow-up status post a trial of PRP with HA into both knees for chronic subpatellar knee pain.  He reports very minimal improvement but nothing significant to either knee.  He denies interval injury or trauma.  He is a little frustrated with his bill from the hospital regarding the hyaluronic acid injections.  He brought this for me to review.    He has no other complaints.    Physical  Examination:     The RIGHT knee is without joint effusion. Patella crepitus is positive. There is minimal tenderness to the medial and lateral joint lines. Flexion and extension are without mechanical blocking. There is no instability with stress testing.   The LEFT knee is without joint effusion. Patella crepitus is positive. There is minimal tenderness to the medial and lateral joint lines. Flexion and extension are without mechanical blocking. There is no instability with stress testing.   Skin - no rashes, sores, or open lesions. Strength, sensory and vascular exams are otherwise normal. There is no clubbing, cyanosis or edema.  Gait is near normal and tandem.    Imaging:  === 09/27/24 ===  XR KNEE 3 VIEWS RIGHT  - Impression -  Moderate bilateral knee osteoarthritis worse in the lateral  compartment of the right knee. Intra-articular bodies in the right  knee posteriorly  Signed by: Ministerio Jerry 9/28/2024 11:40 AM    Problem List Items Addressed This Visit    None  Visit Diagnoses         Codes    Primary osteoarthritis of both knees    -  Primary M17.0              Assessment and Plan:     We reviewed the exam findings and discussed the conservative and surgical treatment options. We agreed that he has not had any significant response to the PRP and HA injection provided 6 weeks ago.  I encouraged him to not give up hope as this is the earliest I would expect to see any benefit and I feel over the next 4 to 6 weeks, there is a high chance for improvement.  Activity modifications were reviewed.  He will follow-up at that time with a MyChart message to me how he is doing.  He will follow-up accordingly.    **This note was dictated using Dragon speech recognition software and was not corrected for spelling or grammatical errors**.    Alon Payan MD  Sports Medicine Specialist  [facility]

## 2025-01-22 ENCOUNTER — HOSPITAL ENCOUNTER (OUTPATIENT)
Dept: RADIOLOGY | Facility: HOSPITAL | Age: 65
Discharge: HOME | End: 2025-01-22
Payer: COMMERCIAL

## 2025-01-22 ENCOUNTER — APPOINTMENT (OUTPATIENT)
Dept: INTEGRATIVE MEDICINE | Facility: CLINIC | Age: 65
End: 2025-01-22

## 2025-01-22 ENCOUNTER — APPOINTMENT (OUTPATIENT)
Dept: PRIMARY CARE | Facility: CLINIC | Age: 65
End: 2025-01-22

## 2025-01-22 ENCOUNTER — HOSPITAL ENCOUNTER (OUTPATIENT)
Dept: CARDIOLOGY | Facility: HOSPITAL | Age: 65
Discharge: HOME | End: 2025-01-22
Payer: COMMERCIAL

## 2025-01-22 ENCOUNTER — NUTRITION (OUTPATIENT)
Dept: PRIMARY CARE | Facility: CLINIC | Age: 65
End: 2025-01-22

## 2025-01-22 VITALS
DIASTOLIC BLOOD PRESSURE: 80 MMHG | WEIGHT: 204 LBS | HEART RATE: 66 BPM | SYSTOLIC BLOOD PRESSURE: 122 MMHG | BODY MASS INDEX: 27.04 KG/M2 | HEIGHT: 73 IN | OXYGEN SATURATION: 96 %

## 2025-01-22 DIAGNOSIS — Z00.00 HEALTH MAINTENANCE EXAMINATION: ICD-10-CM

## 2025-01-22 DIAGNOSIS — Z00.00 HEALTHCARE MAINTENANCE: Primary | ICD-10-CM

## 2025-01-22 DIAGNOSIS — Z00.00 HEALTH MAINTENANCE EXAMINATION: Primary | ICD-10-CM

## 2025-01-22 DIAGNOSIS — Z12.11 COLON CANCER SCREENING: ICD-10-CM

## 2025-01-22 LAB
25(OH)D3 SERPL-MCNC: 75 NG/ML (ref 30–100)
ALBUMIN SERPL BCP-MCNC: 4.3 G/DL (ref 3.4–5)
ALP SERPL-CCNC: 88 U/L (ref 33–136)
ALT SERPL W P-5'-P-CCNC: 20 U/L (ref 10–52)
ANION GAP SERPL CALC-SCNC: 9 MMOL/L (ref 10–20)
AST SERPL W P-5'-P-CCNC: 23 U/L (ref 9–39)
BASOPHILS # BLD AUTO: 0.03 X10*3/UL (ref 0–0.1)
BASOPHILS NFR BLD AUTO: 0.7 %
BILIRUB SERPL-MCNC: 0.8 MG/DL (ref 0–1.2)
BUN SERPL-MCNC: 12 MG/DL (ref 6–23)
CALCIUM SERPL-MCNC: 9.2 MG/DL (ref 8.6–10.3)
CHLORIDE SERPL-SCNC: 103 MMOL/L (ref 98–107)
CHOLEST SERPL-MCNC: 199 MG/DL (ref 0–199)
CHOLESTEROL/HDL RATIO: 3.7
CO2 SERPL-SCNC: 32 MMOL/L (ref 21–32)
CREAT SERPL-MCNC: 0.89 MG/DL (ref 0.5–1.3)
CRP SERPL HS-MCNC: 2.1 MG/L
EGFRCR SERPLBLD CKD-EPI 2021: >90 ML/MIN/1.73M*2
EOSINOPHIL # BLD AUTO: 0.13 X10*3/UL (ref 0–0.7)
EOSINOPHIL NFR BLD AUTO: 2.9 %
ERYTHROCYTE [DISTWIDTH] IN BLOOD BY AUTOMATED COUNT: 11.8 % (ref 11.5–14.5)
FERRITIN SERPL-MCNC: 76 NG/ML (ref 20–300)
GLUCOSE SERPL-MCNC: 96 MG/DL (ref 74–99)
HCT VFR BLD AUTO: 42.1 % (ref 41–52)
HDLC SERPL-MCNC: 54.4 MG/DL
HGB BLD-MCNC: 13.6 G/DL (ref 13.5–17.5)
IMM GRANULOCYTES # BLD AUTO: 0.01 X10*3/UL (ref 0–0.7)
IMM GRANULOCYTES NFR BLD AUTO: 0.2 % (ref 0–0.9)
INSULIN P FAST SERPL-ACNC: 13 UIU/ML (ref 3–25)
IRON SATN MFR SERPL: 44 % (ref 25–45)
IRON SERPL-MCNC: 164 UG/DL (ref 35–150)
LDLC SERPL CALC-MCNC: 122 MG/DL
LYMPHOCYTES # BLD AUTO: 2.39 X10*3/UL (ref 1.2–4.8)
LYMPHOCYTES NFR BLD AUTO: 53.3 %
MAGNESIUM SERPL-MCNC: 2.12 MG/DL (ref 1.6–2.4)
MCH RBC QN AUTO: 31.8 PG (ref 26–34)
MCHC RBC AUTO-ENTMCNC: 32.3 G/DL (ref 32–36)
MCV RBC AUTO: 98 FL (ref 80–100)
MONOCYTES # BLD AUTO: 0.47 X10*3/UL (ref 0.1–1)
MONOCYTES NFR BLD AUTO: 10.5 %
NEUTROPHILS # BLD AUTO: 1.45 X10*3/UL (ref 1.2–7.7)
NEUTROPHILS NFR BLD AUTO: 32.4 %
NON HDL CHOLESTEROL: 145 MG/DL (ref 0–149)
NRBC BLD-RTO: 0 /100 WBCS (ref 0–0)
PLATELET # BLD AUTO: 304 X10*3/UL (ref 150–450)
POC APPEARANCE, URINE: CLEAR
POC BILIRUBIN, URINE: NEGATIVE
POC BLOOD, URINE: NEGATIVE
POC COLOR, URINE: YELLOW
POC GLUCOSE, URINE: NEGATIVE MG/DL
POC KETONES, URINE: NEGATIVE MG/DL
POC LEUKOCYTES, URINE: NEGATIVE
POC NITRITE,URINE: NEGATIVE
POC PH, URINE: 7 PH
POC PROTEIN, URINE: NEGATIVE MG/DL
POC SPECIFIC GRAVITY, URINE: 1.01
POC UROBILINOGEN, URINE: 0.2 EU/DL
POTASSIUM SERPL-SCNC: 4.5 MMOL/L (ref 3.5–5.3)
PROT SERPL-MCNC: 7.4 G/DL (ref 6.4–8.2)
PSA SERPL-MCNC: 1.5 NG/ML
RBC # BLD AUTO: 4.28 X10*6/UL (ref 4.5–5.9)
SODIUM SERPL-SCNC: 139 MMOL/L (ref 136–145)
TIBC SERPL-MCNC: 375 UG/DL (ref 240–445)
TRIGL SERPL-MCNC: 115 MG/DL (ref 0–149)
TSH SERPL-ACNC: 2.72 MIU/L (ref 0.44–3.98)
UIBC SERPL-MCNC: 211 UG/DL (ref 110–370)
URATE SERPL-MCNC: 4.4 MG/DL (ref 4–7.5)
VIT B12 SERPL-MCNC: 804 PG/ML (ref 211–911)
VLDL: 23 MG/DL (ref 0–40)
WBC # BLD AUTO: 4.5 X10*3/UL (ref 4.4–11.3)

## 2025-01-22 PROCEDURE — 82306 VITAMIN D 25 HYDROXY: CPT

## 2025-01-22 PROCEDURE — 86141 C-REACTIVE PROTEIN HS: CPT

## 2025-01-22 PROCEDURE — 84402 ASSAY OF FREE TESTOSTERONE: CPT

## 2025-01-22 PROCEDURE — 93017 CV STRESS TEST TRACING ONLY: CPT

## 2025-01-22 PROCEDURE — 83735 ASSAY OF MAGNESIUM: CPT

## 2025-01-22 PROCEDURE — 82728 ASSAY OF FERRITIN: CPT

## 2025-01-22 PROCEDURE — 84153 ASSAY OF PSA TOTAL: CPT

## 2025-01-22 PROCEDURE — 83525 ASSAY OF INSULIN: CPT

## 2025-01-22 PROCEDURE — 80053 COMPREHEN METABOLIC PANEL: CPT

## 2025-01-22 PROCEDURE — 83036 HEMOGLOBIN GLYCOSYLATED A1C: CPT

## 2025-01-22 PROCEDURE — 83550 IRON BINDING TEST: CPT

## 2025-01-22 PROCEDURE — 83540 ASSAY OF IRON: CPT

## 2025-01-22 PROCEDURE — 82607 VITAMIN B-12: CPT

## 2025-01-22 PROCEDURE — 71046 X-RAY EXAM CHEST 2 VIEWS: CPT

## 2025-01-22 PROCEDURE — 82172 ASSAY OF APOLIPOPROTEIN: CPT

## 2025-01-22 PROCEDURE — 85025 COMPLETE CBC W/AUTO DIFF WBC: CPT

## 2025-01-22 PROCEDURE — 84443 ASSAY THYROID STIM HORMONE: CPT

## 2025-01-22 PROCEDURE — SMAR2 SMART-UHCSM: Performed by: CHIROPRACTOR

## 2025-01-22 PROCEDURE — 84550 ASSAY OF BLOOD/URIC ACID: CPT

## 2025-01-22 PROCEDURE — 80061 LIPID PANEL: CPT

## 2025-01-22 RX ORDER — MULTIVIT-MIN/IRON FUM/FOLIC AC 7.5 MG-4
1 TABLET ORAL DAILY
COMMUNITY

## 2025-01-22 ASSESSMENT — ENCOUNTER SYMPTOMS
EYE PAIN: 0
GASTROINTESTINAL NEGATIVE: 1
SINUS PRESSURE: 0
FEVER: 0
BRUISES/BLEEDS EASILY: 0
DYSURIA: 0
AGITATION: 0
WHEEZING: 0
MYALGIAS: 0
CONFUSION: 0
SORE THROAT: 0
EYE REDNESS: 0
JOINT SWELLING: 0
HEMATURIA: 0
PALPITATIONS: 0
CONSTIPATION: 0
POLYDIPSIA: 0
DIZZINESS: 0
HEMATOLOGIC/LYMPHATIC NEGATIVE: 1
APPETITE CHANGE: 0
TREMORS: 0
FLANK PAIN: 0
DIFFICULTY URINATING: 0
NERVOUS/ANXIOUS: 0
APNEA: 0
COUGH: 0
BLOOD IN STOOL: 0
VOMITING: 0
ABDOMINAL PAIN: 0
HEADACHES: 0
ADENOPATHY: 0
NAUSEA: 0
BACK PAIN: 0
FATIGUE: 0
CHEST TIGHTNESS: 0
NEUROLOGICAL NEGATIVE: 1
SLEEP DISTURBANCE: 0
NUMBNESS: 0
ALLERGIC/IMMUNOLOGIC NEGATIVE: 1
FREQUENCY: 0
ARTHRALGIAS: 0
SHORTNESS OF BREATH: 0
DIARRHEA: 0

## 2025-01-22 ASSESSMENT — PAIN SCALES - GENERAL: PAINLEVEL_OUTOF10: 0-NO PAIN

## 2025-01-22 NOTE — PROGRESS NOTES
Executive Physical         Patient ID: Darrius Castano is a 64 y.o. male who presents for Executive Evaluation:    The following report is in reference to your  executive physical examination which was held at [facility] on 01/22/25. 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    At the time of your evaluation you were feeling well with no significant health concerns. You are aware of a lipoma on your neck and inguinal hernia which is asymptomatic.    You were not complaining of fever ,chills, headache ,dizziness ,cough, chest pain shortness of breath, palpitations, nausea, vomiting, abdominal pain, loss of appetite, diarrhea, blood in the stools, frequent urination or painful urination.    Past Medical History:   Diagnosis Date   • COVID-19     COVID-19 virus infection         Review of Systems   Constitutional:  Negative for appetite change, fatigue and fever.   HENT:  Positive for tinnitus. Negative for congestion, ear discharge, ear pain, hearing loss, mouth sores, postnasal drip, sinus pressure and sore throat.    Eyes:  Negative for pain and redness.   Respiratory:  Negative for apnea, cough, chest tightness, shortness of breath and wheezing.    Cardiovascular:  Negative for chest pain, palpitations and leg swelling.   Gastrointestinal: Negative.  Negative for abdominal pain, blood in stool, constipation, diarrhea, nausea and vomiting.   Endocrine: Negative for polydipsia and polyuria.   Genitourinary:  Negative for decreased urine volume, difficulty urinating, dysuria, enuresis, flank pain, frequency, hematuria, penile discharge, penile pain, scrotal swelling, testicular pain and urgency.   Musculoskeletal:  Negative for arthralgias, back pain, gait problem, joint swelling and myalgias.   Skin:  Negative for pallor and rash.   Allergic/Immunologic:  "Negative.    Neurological: Negative.  Negative for dizziness, tremors, syncope, numbness and headaches.   Hematological: Negative.  Negative for adenopathy. Does not bruise/bleed easily.   Psychiatric/Behavioral:  Negative for agitation, confusion and sleep disturbance. The patient is not nervous/anxious.    All other systems reviewed and are negative.      Patient Active Problem List   Diagnosis   • Pain in both knees   • Arthritis of knee, right   • Arthritis of left knee        Past Surgical History:   Procedure Laterality Date   • US GUIDED THYROID BIOPSY  6/1/2020    US GUIDED THYROID BIOPSY 6/1/2020 [facility]        Family History   Problem Relation Name Age of Onset   • Lung cancer Mother     • Pulmonary fibrosis Father          No Known Allergies       Current Outpatient Medications:   •  ascorbic acid (Vitamin C) 1,000 mg tablet, Take by mouth., Disp: , Rfl:   •  cholecalciferol (Vitamin D-3) 25 MCG (1000 UT) capsule, Take by mouth once daily., Disp: , Rfl:   •  Cinnamon 500 mg capsule, Take 1 capsule (500 mg) by mouth once daily., Disp: , Rfl:   •  coenzyme Q-10 100 mg capsule, Take 1 capsule (100 mg) by mouth., Disp: , Rfl:   •  L. acidophilus/Bifid. animalis 32 billion cell capsule, Take by mouth., Disp: , Rfl:   •  multivitamin with minerals tablet, Take 1 tablet by mouth once daily., Disp: , Rfl:   •  saw palmetto-zinc picolinate 450-15 mg capsule, Take by mouth., Disp: , Rfl:   •  turmeric/turmeric ext/pepr ext (turmeric-turmeric ext-pepper) 900-100-5 mg capsule, Take by mouth., Disp: , Rfl:      Visit Vitals  /80 (BP Location: Right arm, Patient Position: Sitting, BP Cuff Size: Large adult)   Pulse 66   Ht 1.854 m (6' 1\")   Wt 92.5 kg (204 lb)   SpO2 96%   BMI 26.91 kg/m²   Smoking Status Never   BSA 2.18 m²        Physical Exam  Vitals reviewed.   Constitutional:       Appearance: Normal appearance.   HENT:      Head: Normocephalic and atraumatic.      Right Ear: Tympanic membrane and ear " canal normal.      Left Ear: Tympanic membrane and ear canal normal.      Nose: Nose normal.      Mouth/Throat:      Mouth: Mucous membranes are moist.   Eyes:      Extraocular Movements: Extraocular movements intact.      Conjunctiva/sclera: Conjunctivae normal.      Pupils: Pupils are equal, round, and reactive to light.   Neck:      Comments: Posterior neck mass-c/w Lipoma  Cardiovascular:      Rate and Rhythm: Normal rate and regular rhythm.      Pulses: Normal pulses.      Heart sounds: Normal heart sounds. No murmur heard.     No friction rub. No gallop.   Pulmonary:      Effort: Pulmonary effort is normal. No respiratory distress.      Breath sounds: Normal breath sounds. No wheezing or rales.   Abdominal:      General: Abdomen is flat. Bowel sounds are normal. There is no distension.      Palpations: Abdomen is soft. There is no mass.      Tenderness: There is no abdominal tenderness. There is no guarding or rebound.      Hernia: A hernia is present.   Genitourinary:     Penis: Normal.       Testes: Normal.      Prostate: Normal.      Rectum: Normal. Guaiac result negative.   Musculoskeletal:         General: No swelling, tenderness or deformity. Normal range of motion.      Cervical back: Normal range of motion.   Skin:     General: Skin is warm.      Findings: Lesion present. No bruising or rash.   Neurological:      General: No focal deficit present.      Mental Status: He is alert and oriented to person, place, and time.      Sensory: No sensory deficit.      Motor: No weakness.      Coordination: Coordination normal.   Psychiatric:         Mood and Affect: Mood normal.         Behavior: Behavior normal.        Ancillary studies:    Vision screening- Far ***, Near ***    Bone density - Normal     Audiogram -Normal      Discussion/Summary  In summary you are 64 y.o. male with a past medical history of *** .  At the time of your evaluation you were feeling well with no significant health  concerns.    Physical examination including blood pressure , cardiovascular and body mass index/ % body fat  was unremarkable.    Lab studies including complete blood count, comprehensive metabolic panel,  lipid panel, thyroid function, *** , Vitamin D, Vitamin B12, magnesium,iron, uric acid ,insulin, Lp(a) ***   and inflammatory markers were normal.      Cardiology- Normal EKG, CT-Cardiac score, Exercise stress test, Carotid and Abdominal Aorta ultrasound studies.     {[facility][id number]}    Skin - Please follow up with dermatology for annual examination. Monitor for any skin lesions( ugly duckling sign)  that are different in color, shape, or size than others on body. Recommend SPF 30+, hats with brims, sun protective clothing, and avoiding sun exposure between 10 AM and 2 PM whenever possible. Recommend a daily skin moisturizer such as Aveeno or Lac-Hydrin.    Vaccine- I would  recommend a shingles (Shingrix) vaccine at age 50  onwards.  In addition I would recommend a tetanus booster every 10 years to maintain immunity. Consider a   Pneumonia vaccine (Prevnar 20) at age 50 onwards. Recommend RSV at  age 60 onwards. Consider COVID-19 booster and flu shot this winter.    Wellness: Please continue with a balanced diet and a regular physical activity program for at least 150 minutes/week of moderate exercise and 30 minutes/week of resistance/weight training per week.  Try to get 6 to 8 hours of sleep per night.  Please download the Calm,  Headspace or Unwinding Anxiety  sujata from the [employer] to assist with stress and sleep management if necessary.    In conclusion,  I wish to thank you for attending the  executive health program at [facility].  I wish you and your family a safe and healthy Holiday  season.    Please email me at [email or web address] or call me at [phone] if you have any questions pertaining to this report or any other medical concerns.    Be Well    Dr JOSE CARLOS James and  Africa Barker Master Clinician in Wellness    Senior Attending Physician , [facility]    Clinical     [facility] [employer]    [place], [place]    This note was partially generated using the Dragon voice recognition system.  There may be some incorrect wording ,grammar, spelling or punctuation errors that were not corrected prior to committing the note to the medical record.

## 2025-01-22 NOTE — PROGRESS NOTES
Reason for Nutrition Visit:  It was a pleasure speaking with Mr. Castano again to discuss diet and nutrition as part of his [facility] follow-up.    Medication Documentation Review Audit       Reviewed by Sharon Short MA (Medical Assistant) on 01/22/25 at 1008      Medication Order Taking? Sig Documenting Provider Last Dose Status   ascorbic acid (Vitamin C) 1,000 mg tablet [id number] Yes Take by mouth. Historical Provider, MD  Active   cholecalciferol (Vitamin D-3) 25 MCG (1000 UT) capsule [id number] Yes Take by mouth once daily. Historical Provider, MD  Active   Cinnamon 500 mg capsule [id number] Yes Take 1 capsule (500 mg) by mouth once daily. Historical Provider, MD  Active   coenzyme Q-10 100 mg capsule [id number] Yes Take 1 capsule (100 mg) by mouth. Historical Provider, MD  Active   cyanocobalamin (Vitamin B-12) 1,000 mcg tablet [id number]  Take by mouth.   Patient not taking: Reported on 1/22/2025    Historical Provider, MD  Active   L. acidophilus/Bifid. animalis 32 billion cell capsule [id number] Yes Take by mouth. Historical Provider, MD  Active   saw palmetto-zinc picolinate 450-15 mg capsule [id number] Yes Take by mouth. Historical Provider, MD  Active   turmeric/turmeric ext/pepr ext (turmeric-turmeric ext-pepper) 900-100-5 mg capsule [id number] Yes Take by mouth. Historical Provider, MD  Active                     Past Medical Hx:  Patient Active Problem List   Diagnosis    Pain in both knees    Arthritis of knee, right    Arthritis of left knee        Weight change:  His current weight is 204lbs.  He would like his weight to be closer to 185lbs.    Significant Weight Change: No    Lab Results   Component Value Date    HGBA1C 4.8 01/26/2024    CHOL 240 (H) 01/26/2024    LDLCALC 166 (H) 01/26/2024    TRIG 109 01/26/2024    HDL 52.1 01/26/2024    LDLF 130 (H) 05/16/2022        Food and Nutrition Hx:  He is a vegetarian and has been for the past 20 years.  His protein sources include tofu, beans, nuts,  seeds, protein bars.    He reports eating a protein bar for breakfast.  He will also have a protein bar for an afternoon snack.  He is still using ONE protein bars, which provide 20g protein.  He goes to the cafeteria for lunch during the work week.  He gets either soup (lentil, potato, vegetable) or salad bar-tofu, egg, sunflower seeds or pumpkin seeds, carrots, mixed greens or [place] lettuce, balsamic vinaigrette.      He is eating out for dinner twice a week.      He does not eat any fish.      24 Hour Food Recall:  Breakfast: ONE protein bar  Lunch: salad-tofu, egg, sunflower seeds or pumpkin seeds, carrots, mixed greens or [place] lettuce, balsamic vinaigrette.    Snack: ONE protein bar  Dinner: soup-minestrone soup (homemade with veggies and beans, no pasta)    Beverages: water-40oz a day; coffee-1 cup a day (black); Diet Coke-24oz a day; alcohol-none    Allergies: None  Intolerance: None    GI Symptoms : None He has a bowel movement everyday.  He reports occasional bloating.    Exercise: He has two bad knees which limits the amount of time he can go to the gym.  He uses the elliptical machine and walks on the treadmill at an incline, 60 minutes total, 3 days a week.  He also does strength training on those 3 days a week, 20 minutes.      Sleep duration/quality : 7-8 hours a night.  No issues falling asleep.  He gets up once to use the bathroom, but falls back to sleep easily.    Supplements: Multivitamin, Vitamin B12, Vitamin C, Vitamin D, Magnesium, Coq10, Tumeric, Probiotic, and Saw Palmetto, magnesium citrate  daily    Cravings: None  Energy Levels: High      Estimated Nutrient Needs:    Calories for Weight Maintenance: 2479 ([place] [address] Jeor x 1.4 activity factor)  Calories for Weight Loss: 0285-7657  Protein Needs: 130g/day (0.7g/lb DBW, 185lbs)    Nutrition Diagnosis:    Diagnosis Statement 1:  Diagnosis Status: Ongoing  Diagnosis : Inadequate protein intake  related to  food- and nutrition-related  knowledge deficit regarding appropriate protein intake  as evidenced by  food recall showing he is not meeting the recommended 130g protein per day      Nutrition Interventions:  Increased Protein Diet, Decreased Energy Diet      Nutrition Goals:  Nutrition Goals : Consume prescribed supplements  Reduce Kcal Intake  Weight Loss  Adequate protein intake    Nutrition Recommendations:    1) To promote gradual weight loss, I would recommend following an [id number] calories a day meal plan.  Consider tracking your food intake using an krystal such as My Fitness Pal or Cronometer to track both calories and protein.  Recommended macronutrient breakdown: 130g protein per day (30% of calories), 130-140g carbohydrates per day (30% of calories), 82g fat (40% of calories).    2) Try adding a pre-made protein drink to your protein bar at breakfast.  Recommended brand: OWYN Pro Elite (provides 32g protein per bottle).    3) With protein bars being a form of processed foods, consider switching from the ONE protein bars to a product that has a shorter and  ingredient list such as Aloha bars.    4) Recommended supplement:   -[employer] Algae [place] - this will help with reaching your recommended intake of omega-3 fatty acids

## 2025-01-23 DIAGNOSIS — Z12.11 COLON CANCER SCREENING: Primary | ICD-10-CM

## 2025-01-23 LAB
EST. AVERAGE GLUCOSE BLD GHB EST-MCNC: 88 MG/DL
HBA1C MFR BLD: 4.7 %

## 2025-01-23 RX ORDER — SODIUM, POTASSIUM,MAG SULFATES 17.5-3.13G
0.51 SOLUTION, RECONSTITUTED, ORAL ORAL SEE ADMIN INSTRUCTIONS
Qty: 354 ML | Refills: 0 | Status: SHIPPED | OUTPATIENT
Start: 2025-01-23

## 2025-01-24 LAB — LPA SERPL-MCNC: 23 MG/DL

## 2025-01-25 LAB
TESTOSTERONE FREE (CHAN): 48.2 PG/ML (ref 35–155)
TESTOSTERONE,TOTAL,LC-MS/MS: 442 NG/DL (ref 250–1100)

## 2025-02-16 NOTE — PROGRESS NOTES
"  Darrius is a 65 y/o male and current  is "[employer] who presents to [facility] for stress management and resilience training in coordination with  [facility].   His current stress is family related with the recent and sudden death of his son-in-law        Manifestation/Awareness of Stress: - Personal check in. Do you have any of these symptoms of chronic stress?  Body/Physical (ex. tension, breathing quickly)?  Mental /Emotional (ex. poor focus, rumination, irritability, withdrawn)?     Current Self-Care/Stress Management Strategy:  Chronic stress can effect your ability to perform optimally both mentally and physically and has been linked to inflammatory conditions and chronic diseases such as elevated cholesterol, diabetes, and gastric ulcers. When the parasympathetic nervous system is activated, the body enters a state of relaxation, allowing for recovery, repair, and immune system activation      Building Resilience - I have added the below examples for your information. They align with what we discussed today  Mindful Eating  Current:   Recommended sitting for meals, away from your desk  Discontinue working, electronics, stressful conversations, news, etc. during mealtime  3 deep breaths, feel feet on floor  \"Breathing in I calm my body, breathing out, I smile\" x 3  Engage your senses. Feel gratitude for your food  Feel the saliva pool in your mouth prior to taking the first bite.   Mindful eating improves overall digestion.         2. Enjoyable Movement and Time in Nature:  Try practicing mindfulness during a walk or hike. Take in the sights, smells, textures around you      3. Restorative Sleep  Quantity (how much):  Quality (continuous vs. fragmented):   Regularity (sleep/wake time the same +/- 30 mins):   Bedtime routine?   The first half of the night is dominated by non-REM deep sleep; the second half of our sleep is dominated by REM sleep  If you sleep hours later than usual (maybe because of an " event) - you will likely have more REM sleep cycles and less deep sleep  Deep sleep (non-REM) is critical for learning and memory, immune repair and replenishment, metabolic function (blood sugar regulation), hormone optimization  REM sleep allows you to dream, problem solve, and regulate emotions.      Timing (in alignment with chronotype):   Chronotypes: (1) extreme morning type (sleep at 8pm, wake up at 4am); (2) morning type (sleep at 9pm, wake up at 6am); (3) neutral (sleep at 11pm, wake up at 7:30am); (4) evening type (sleep at 1am, wake up around 9:30am); (5) extreme evening type (sleep at 2:30am, waking up mid morning)   Last food:  Recommend at least 2-3 hours prior to bedtime.   Last caffeine:   Recommend last caffeine by noon.   Last alcohol:   Recommend discontinuing 4 hours prior to bedtime.   Screen time:  Recommend discontinuing blue light 1 hour prior to bedtime.    To optimize circadian rhythm may consider removing unnecessary sources of light from the bedroom in addition to utilizing a sleep mask or blackout shades.    Bedroom Temp:   Optimal temperature for sleep is 65 degrees Fahrenheit (18.3 degrees Celsius)        4. Social Connection     Social connection:   Social connection improves your ability to recover from stress, anxiety, and depression as well as to experience a greater sense of overall well-being.        [facility]  Heart Rate Variability (HRV)  Reflects the heart’s adaptability to different situations and provides insights into stress levels and overall health and well-being.  HRV is unique to each individual.   A normal HRV for adults ranges from below 20 to over 200 milliseconds.  Each person’s HRV is unique, so compare your HRV to your averages and avoid comparisons to others.   It’s perfectly normal to observe daily and seasonal fluctuations in your HRV, a sign of your heart's adaptability.  Autonomic nervous system  Higher HRV associated with rest-and-digest, general fitness, and good  recovery  Your rest-and-digest system (PNS) tells your heart to slow down, making room for variability between beats  Lower HRV with fight-or-flight responses, stress, illness, or over-training  Your fight-or-flight system (SNS) tells your heart to speed up, limiting space for variability (lower HRV)     Increasing HRV  Practice deep breathing exercises and meditations  Engage in regular physical activity (ideally outdoors)  Prioritize restful sleep and address sleep disturbances  Focus on nourishing foods and stay hydrated  Cold water therapy:  Immerse your face in a bowl of ice water for 5-10 seconds.  End your shower with 30-60 seconds of cold water; increase as tolerated.  Advance to cold baths/plunges if desired (max benefit at 10 min/week).  Music therapy:  Humming, singing, playing an instrument, or dancing        Assessment/Plan:      Discussed physiologic changes that occur with acute vs. chronic stress, the autonomic nervous system, evidence re: neuroplasticity of mindfulness, and different mindfulness practices. Also discussed lifestyle habits that support the Body's natural defense mechanisms. Also discussed lifestyle habits that support the Body's natural defense mechanisms. Topics included the importance of Breath, Practicing `being Mindful/ Present, Operating above the line and Gratitude as ways to be the best version of yourself and to build resilience against the negative effects of stress.   Practiced 4:4 breath  Recommended apps:   Guided meditations:   [employer] guided meditations   Headspace krystal  Calm krystal  [place] krystal  Heart Math device/krystal  Muse device/krystal  Yoga Online:  Yoga with Marsha [email or web address] (YouTube)  Yoga with Joce [email or web address] (YouTube)  [employer]     Darrius it was a pleasure to meet you. Please take a moment to visit the [employer] webpage for information about our services.  [email or web address].   Questions or if you would like to more about the research in this area of medicine - [email or web address]

## 2025-05-03 DIAGNOSIS — Z12.11 COLON CANCER SCREENING: ICD-10-CM

## 2025-05-03 DIAGNOSIS — E78.00 PURE HYPERCHOLESTEROLEMIA: Primary | ICD-10-CM

## 2025-05-09 LAB
CHOLEST SERPL-MCNC: 216 MG/DL
CHOLEST/HDLC SERPL: 3.6 (CALC)
HDLC SERPL-MCNC: 60 MG/DL
LDLC SERPL CALC-MCNC: 131 MG/DL (CALC)
NONHDLC SERPL-MCNC: 156 MG/DL (CALC)
TRIGL SERPL-MCNC: 130 MG/DL

## 2025-05-12 ENCOUNTER — TELEPHONE (OUTPATIENT)
Dept: PRIMARY CARE | Facility: CLINIC | Age: 65
End: 2025-05-12
Payer: COMMERCIAL

## 2025-05-12 DIAGNOSIS — E78.5 HYPERLIPIDEMIA, UNSPECIFIED HYPERLIPIDEMIA TYPE: Primary | ICD-10-CM

## 2025-05-12 RX ORDER — ATORVASTATIN CALCIUM 20 MG/1
20 TABLET, FILM COATED ORAL DAILY
Qty: 90 TABLET | Refills: 2 | Status: SHIPPED | OUTPATIENT
Start: 2025-05-12 | End: 2026-02-06

## 2025-05-12 NOTE — TELEPHONE ENCOUNTER
T/c with Mr A re elevated Lipid panels- recommend starting atorvastatin 20 mg daily. Repeat lipid panel in 4-6 weeks. Side effects explained to patient.

## 2025-05-15 ENCOUNTER — ANESTHESIA EVENT (OUTPATIENT)
Dept: GASTROENTEROLOGY | Facility: HOSPITAL | Age: 65
End: 2025-05-15
Payer: COMMERCIAL

## 2025-05-15 ENCOUNTER — ANESTHESIA (OUTPATIENT)
Dept: GASTROENTEROLOGY | Facility: HOSPITAL | Age: 65
End: 2025-05-15
Payer: COMMERCIAL

## 2025-05-15 ENCOUNTER — HOSPITAL ENCOUNTER (OUTPATIENT)
Dept: GASTROENTEROLOGY | Facility: HOSPITAL | Age: 65
Discharge: HOME | End: 2025-05-15
Payer: COMMERCIAL

## 2025-05-15 VITALS
HEART RATE: 76 BPM | OXYGEN SATURATION: 99 % | BODY MASS INDEX: 26.3 KG/M2 | DIASTOLIC BLOOD PRESSURE: 71 MMHG | RESPIRATION RATE: 16 BRPM | WEIGHT: 198.41 LBS | TEMPERATURE: 97 F | SYSTOLIC BLOOD PRESSURE: 119 MMHG | HEIGHT: 73 IN

## 2025-05-15 DIAGNOSIS — Z12.11 COLON CANCER SCREENING: ICD-10-CM

## 2025-05-15 PROBLEM — E78.5 HYPERLIPIDEMIA: Status: ACTIVE | Noted: 2025-05-15

## 2025-05-15 PROBLEM — M19.90 OSTEOARTHRITIS: Status: ACTIVE | Noted: 2025-05-15

## 2025-05-15 PROCEDURE — 3700000002 HC GENERAL ANESTHESIA TIME - EACH INCREMENTAL 1 MINUTE

## 2025-05-15 PROCEDURE — 45378 DIAGNOSTIC COLONOSCOPY: CPT | Performed by: INTERNAL MEDICINE

## 2025-05-15 PROCEDURE — 2500000004 HC RX 250 GENERAL PHARMACY W/ HCPCS (ALT 636 FOR OP/ED): Performed by: ANESTHESIOLOGIST ASSISTANT

## 2025-05-15 PROCEDURE — A45378 PR COLONOSCOPY,DIAGNOSTIC: Performed by: ANESTHESIOLOGIST ASSISTANT

## 2025-05-15 PROCEDURE — 7100000010 HC PHASE TWO TIME - EACH INCREMENTAL 1 MINUTE

## 2025-05-15 PROCEDURE — A45378 PR COLONOSCOPY,DIAGNOSTIC: Performed by: ANESTHESIOLOGY

## 2025-05-15 PROCEDURE — 3700000001 HC GENERAL ANESTHESIA TIME - INITIAL BASE CHARGE

## 2025-05-15 PROCEDURE — 7100000009 HC PHASE TWO TIME - INITIAL BASE CHARGE

## 2025-05-15 RX ORDER — FENTANYL CITRATE 50 UG/ML
INJECTION, SOLUTION INTRAMUSCULAR; INTRAVENOUS AS NEEDED
Status: DISCONTINUED | OUTPATIENT
Start: 2025-05-15 | End: 2025-05-15

## 2025-05-15 RX ORDER — MIDAZOLAM HYDROCHLORIDE 1 MG/ML
INJECTION INTRAMUSCULAR; INTRAVENOUS AS NEEDED
Status: DISCONTINUED | OUTPATIENT
Start: 2025-05-15 | End: 2025-05-15

## 2025-05-15 RX ORDER — PROPOFOL 10 MG/ML
INJECTION, EMULSION INTRAVENOUS CONTINUOUS PRN
Status: DISCONTINUED | OUTPATIENT
Start: 2025-05-15 | End: 2025-05-15

## 2025-05-15 RX ADMIN — FENTANYL CITRATE 50 MCG: 50 INJECTION, SOLUTION INTRAMUSCULAR; INTRAVENOUS at 13:23

## 2025-05-15 RX ADMIN — MIDAZOLAM HYDROCHLORIDE 2 MG: 1 INJECTION, SOLUTION INTRAMUSCULAR; INTRAVENOUS at 13:14

## 2025-05-15 RX ADMIN — PROPOFOL 300 MCG/KG/MIN: 10 INJECTION, EMULSION INTRAVENOUS at 13:23

## 2025-05-15 RX ADMIN — FENTANYL CITRATE 25 MCG: 50 INJECTION, SOLUTION INTRAMUSCULAR; INTRAVENOUS at 13:32

## 2025-05-15 RX ADMIN — PROPOFOL 40 MG: 10 INJECTION, EMULSION INTRAVENOUS at 13:24

## 2025-05-15 RX ADMIN — FENTANYL CITRATE 25 MCG: 50 INJECTION, SOLUTION INTRAMUSCULAR; INTRAVENOUS at 13:29

## 2025-05-15 ASSESSMENT — PAIN SCALES - GENERAL
PAINLEVEL_OUTOF10: 0 - NO PAIN
PAIN_LEVEL: 0

## 2025-05-15 ASSESSMENT — PAIN - FUNCTIONAL ASSESSMENT
PAIN_FUNCTIONAL_ASSESSMENT: 0-10
PAIN_FUNCTIONAL_ASSESSMENT: UNABLE TO SELF-REPORT

## 2025-05-15 ASSESSMENT — ENCOUNTER SYMPTOMS: CONSTITUTIONAL NEGATIVE: 1

## 2025-05-15 ASSESSMENT — COLUMBIA-SUICIDE SEVERITY RATING SCALE - C-SSRS
6. HAVE YOU EVER DONE ANYTHING, STARTED TO DO ANYTHING, OR PREPARED TO DO ANYTHING TO END YOUR LIFE?: NO
1. IN THE PAST MONTH, HAVE YOU WISHED YOU WERE DEAD OR WISHED YOU COULD GO TO SLEEP AND NOT WAKE UP?: NO
2. HAVE YOU ACTUALLY HAD ANY THOUGHTS OF KILLING YOURSELF?: NO

## 2025-05-15 NOTE — H&P
"History Of Present Illness  Darrius Castano is a 65 y.o. male presenting with average risk colon cancer screening referred by Maged Stewart MD.     Past Medical History  Medical History[1]  Surgical History  Surgical History[2]  Social History  He reports that he has never smoked. He has never used smokeless tobacco. He reports current alcohol use. He reports that he does not use drugs.    Family History  Family History[3]     Allergies  Allergies[4]  Review of Systems   Constitutional: Negative.         Physical Exam  Constitutional:       Appearance: Normal appearance. He is normal weight.   Cardiovascular:      Rate and Rhythm: Normal rate.   Pulmonary:      Effort: Pulmonary effort is normal.      Breath sounds: Normal breath sounds.   Abdominal:      General: Abdomen is flat. Bowel sounds are normal.      Palpations: Abdomen is soft.   Neurological:      Mental Status: He is alert.   Psychiatric:         Mood and Affect: Mood normal.         Behavior: Behavior normal.         Thought Content: Thought content normal.         Judgment: Judgment normal.          Last Recorded Vitals  Blood pressure 136/83, pulse 65, temperature 36.2 °C (97.2 °F), temperature source Temporal, resp. rate 18, height 1.854 m (6' 1\"), weight 90 kg (198 lb 6.6 oz), SpO2 98%.    Assessment/Plan   Colonoscopy as planned     Dane Wellington DO       [1]   Past Medical History:  Diagnosis Date    COVID-19     COVID-19 virus infection    OA (osteoarthritis)    [2]   Past Surgical History:  Procedure Laterality Date    US GUIDED THYROID BIOPSY  6/1/2020    US GUIDED THYROID BIOPSY 6/1/2020 [facility]   [3]   Family History  Problem Relation Name Age of Onset    Lung cancer Mother      Pulmonary fibrosis Father     [4] No Known Allergies    "

## 2025-05-15 NOTE — ANESTHESIA PREPROCEDURE EVALUATION
Patient: Darrius Castano    Procedure Information       Date/Time: 05/15/25 1310    Scheduled providers: Dane Wellington DO; Ron Nash MD    Procedure: COLONOSCOPY    Location: [facility]            Relevant Problems   Anesthesia (within normal limits)      Cardiac   (+) Hyperlipidemia      Pulmonary  Hx Covid      GI (within normal limits)      /Renal (within normal limits)      Liver (within normal limits)      Musculoskeletal   (+) Osteoarthritis                                                         Pre-Anesthesia Evaluation                                         Darrius Castano is a 65 y.o. male who presents for the above mentioned procedure due to Colon cancer screening [Z12.11]    Medical History[1]  Surgical History[2]  Social History[3]  RX Allergies[4]  Current Medications[5]  Prior to Admission medications    Medication Sig Start Date End Date Taking? Authorizing Provider   ascorbic acid (Vitamin C) 1,000 mg tablet Take by mouth.    Historical Provider, MD   atorvastatin (Lipitor) 20 mg tablet Take 1 tablet (20 mg) by mouth once daily. 5/12/25 2/6/26  Maged Stewart MD   cholecalciferol (Vitamin D-3) 25 MCG (1000 UT) capsule Take by mouth once daily.    Historical Provider, MD   Cinnamon 500 mg capsule Take 1 capsule (500 mg) by mouth once daily.    Historical Provider, MD   coenzyme Q-10 100 mg capsule Take 1 capsule (100 mg) by mouth.    Historical Provider, MD   L. acidophilus/Bifid. animalis 32 billion cell capsule Take by mouth.    Historical Provider, MD   multivitamin with minerals tablet Take 1 tablet by mouth once daily.    Historical Provider, MD   saw palmetto-zinc picolinate 450-15 mg capsule Take by mouth.    Historical Provider, MD   sodium,potassium,mag sulfates (Suprep) 17.5-3.13-1.6 gram solution Take 1 bottle by mouth see administration instructions. 1/23/25   Dane Wellington DO   turmeric/turmeric ext/pepr ext (turmeric-turmeric ext-pepper) 900-100-5 mg capsule Take by  "mouth.    Historical Provider, MD     No medication comments found.  Visit Vitals  Smoking Status Never                             1/22/2025    10:04 AM 1/26/2024     9:45 AM 5/16/2022    10:14 AM 3/1/2021     4:24 PM 5/14/2020    10:34 AM   BP REVIEW   BP (ultimate) 122/80 116/78 124/82 140/88 132/82     Recent Labs     01/22/25  1056 01/26/24  1009   WBC 4.5 4.5   HGB 13.6 14.4   HCT 42.1 45.6    332   MCV 98 100     Recent Labs     01/22/25  1056 01/26/24  1009   EGFR >90 84   ANIONGAP 9* 12   BUN 12 15   CREATININE 0.89 1.01    139   K 4.5 5.0    102   CO2 32 30   GLUCOSE 96 95   CALCIUM 9.2 9.6     Recent Labs     01/22/25  1056 01/26/24  1009 05/16/22  1131 03/01/21  1659   HGBA1C 4.7 4.8   < >  --    TSH 2.72 2.78   < > 1.80   FREET4  --   --   --  0.86    < > = values in this interval not displayed.     Recent Labs     01/22/25  1056 01/26/24  1009   BILITOT 0.8 0.8   PROT 7.4 7.8   ALBUMIN 4.3 4.3   ALKPHOS 88 81   ALT 20 30   AST 23 25     No results for input(s): \"PROTIME\", \"INR\" in the last 93932 hours.  Lab Results   Component Value Date    FERRITIN 76 01/22/2025    TIBC 375 01/22/2025    IRONSAT 44 01/22/2025     No results found for: \"STAPHMRSASCR\"  No results for input(s): \"AMPHETAMINE\", \"MAMPHBLDS\", \"BARBITURATE\", \"BENZODIAZ\", \"BUPRENBLDS\", \"CANNABBLDS\", \"COCBLDS\", \"METHABLDS\", \"OXYBLDS\", \"PCPBLDS\", \"OPIATBLDS\", \"DRBLDCOMM\" in the last 83448 hours.  No results for input(s): \"PHART\", \"VYF3TDF\", \"PO2ART\", \"VFP1HNM\", \"L4OAWIEY\", \"BEART\", \"Z5EJHFSA\" in the last 06428 hours.      Lab Results   Component Value Date    ABO B 05/16/2022     EKG No results found for this or any previous visit (from the past 4464 hours).  Ejection Fractions:No results found for: \"EF\"  EchocardiogramNo results found for this or any previous visit from the past 68606 days.    Stress TestingNo results found for this or any previous visit from the past 39602 days.    Cardiac CatheterizationNo results found for " "this or any previous visit from the past 92565 days.    Cardiac Scoring   CT cardiac scoring wo IV contrast 01/26/2024    Narrative  Interpreted By:  Lino Shepherd,  STUDY:  CT CARDIAC SCORING WO IV CONTRAST;  1/26/2024 11:39 am    INDICATION:  Signs/Symptoms:Executive.    COMPARISON:  05/16/2022    ACCESSION NUMBER(S):  [id number]    ORDERING CLINICIAN:  MEGHAN GARIBAY    TECHNIQUE:  Using prospective ECG gating, CT scan of the coronary arteries was  performed without intravenous contrast. Coronary calcium scoring  was  performed according to the method of Agatston.    FINDINGS:  The score and distribution of calcium in the coronary arteries is as  follows:    LM            0  LAD           0  LCx           7.15  RCA           0    Total 7.15      The heart size is normal and there is no pericardial effusion. The  chest vasculature is unremarkable.There is no significant mediastinal  or hilar adenopathy.    The visualized lungs are clear. The visualized upper abdominal  contents are within normal limits. The bony structures are intact.    Impression  Coronary artery calcium score of  7.15.  This has increased minimally from a score of 0 previously.        Coronary artery calcium scoring may be helpful in predicting the risk  for future coronary heart disease events.  According to the American  College of Cardiology Foundation Clinical Expert Consensus Task  Force, such testing provides important prognostic information in  patients with more than one coronary heart disease risk factor. The  coronary artery calcium score correlates with the annual risk of a  non-fatal myocardial infarction or coronary heart disease death.    Coronary artery score            Annual Risk    0-99                               0.4%  100-399                          1.3%  >400                              2.4%    These three \"breakpoints\" correspond to lower, intermediate and high  risk states for future coronary events.  Such information " should be  used, along with appropriate clinical judgment, to make decisions  regarding the intensity of risk factor management strategies to treat  blood lipids and to modify other non-lipid coronary risk factors.    Reference: Paxton P et al. Circulation.  2007; 115:402-426    Signed by: Lino Shepherd 1/26/2024 12:37 PM  Dictation workstation:   [id number]    AAA screenNo results found for this or any previous visit from the past 78902 days.    Carotid Doppler  Vascular US Carotid Artery Duplex Bilateral 01/26/2024    [place] [id number]  Tel [phone] and Fax [phone]      Vascular Lab Report  [facility] US CAROTID ARTERY DUPLEX BILATERAL      Patient Name:      CALIN Cross Physician: [id number] Bhavana Wills MD  Study Date:        1/26/2024           Ordering           [id number] MEGHAN GARIBAY  Physician:  MRN/PID:           [id number]            Technologist:      Tara Jade T  Accession#:        [id number]        Technologist 2:  Date of Birth/Age: 1960 / 63 years Encounter#:        [id number]  Gender:            M  Admission Status:  Outpatient          Location           [facility]  Performed:      Diagnosis/ICD: Other specified symptoms and signs involving the circulatory and  respiratory systems-R09.89  CPT Codes:     39080 Cerebrovascular Carotid Duplex scan limited      CONCLUSIONS:  Right Carotid: Findings are consistent with less than 50% stenosis of the right proximal internal carotid artery.  Left Carotid: Findings are consistent with less than 50% stenosis of the left proximal internal carotid artery.    Comparison:  Compared with study from 5/16/2022, no significant change.    Imaging & Doppler Findings:  Right Plaque Morph: The proximal right internal carotid artery demonstrates heterogenous plaque.  Left Plaque Morph: The proximal left internal carotid artery demonstrates heterogenous plaque. The distal left  "common carotid artery demonstrates intimal thickening plaque.    Right                 Left  PSV     EDV           PSV     EDV  82 cm/s 34 cm/s ICA P 72 cm/s 27 cm/s      [id number] Bhavana Wills MD  Electronically signed by [id number] Bhavana Wills MD on 1/26/2024 at 12:44:08 PM        ** Final **    X Ray === 01/22/25 ===    XR CHEST 2 VIEWS    - Impression -  No focal infiltrate or pneumothorax.    MACRO:  None.    Signed by: Fredy Saenz 1/24/2025 9:33 AM  Dictation workstation:   [id number]  Pulmonary Function Tests  No results found for: \"FEV1\", \"FVC\", \"KKN4PHC\", \"TLC\", \"DLCO\"   OTHER: No results found for this or any previous visit from the past 1825 days.        No results found for: \"PREGTESTUR\", \"PREGSERUM\", \"HCG\", \"HCGQUANT\"  The 10-year ASCVD risk score (Neva LÓPEZ, et al., 2019) is: 8.8%    Values used to calculate the score:      Age: 65 years      Sex: Male      Is Non- : Yes      Diabetic: No      Tobacco smoker: No      Systolic Blood Pressure: 122 mmHg      Is BP treated: No      HDL Cholesterol: 60 mg/dL      Total Cholesterol: 216 mg/dL    Code Status: Assume Full                   Clinical information reviewed:                   NPO Detail:  No data recorded     Physical Exam    Airway  Mallampati: II     Cardiovascular    Dental    Pulmonary    Abdominal            Anesthesia Plan    History of general anesthesia?: yes  History of complications of general anesthesia?: no    ASA 2     MAC     intravenous induction   Anesthetic plan and risks discussed with patient.           [1]   Past Medical History:  Diagnosis Date    COVID-19     COVID-19 virus infection   [2]   Past Surgical History:  Procedure Laterality Date    US GUIDED THYROID BIOPSY  6/1/2020    US GUIDED THYROID BIOPSY 6/1/2020 [facility]   [3]   Social History  Tobacco Use    Smoking status: Never    Smokeless tobacco: Never   Vaping Use    Vaping status: Never Used   Substance Use Topics    Alcohol use: Yes     " Comment: Very rare occasions   [4] No Known Allergies  [5]   Current Outpatient Medications:     ascorbic acid (Vitamin C) 1,000 mg tablet, Take by mouth., Disp: , Rfl:     atorvastatin (Lipitor) 20 mg tablet, Take 1 tablet (20 mg) by mouth once daily., Disp: 90 tablet, Rfl: 2    cholecalciferol (Vitamin D-3) 25 MCG (1000 UT) capsule, Take by mouth once daily., Disp: , Rfl:     Cinnamon 500 mg capsule, Take 1 capsule (500 mg) by mouth once daily., Disp: , Rfl:     coenzyme Q-10 100 mg capsule, Take 1 capsule (100 mg) by mouth., Disp: , Rfl:     L. acidophilus/Bifid. animalis 32 billion cell capsule, Take by mouth., Disp: , Rfl:     multivitamin with minerals tablet, Take 1 tablet by mouth once daily., Disp: , Rfl:     saw palmetto-zinc picolinate 450-15 mg capsule, Take by mouth., Disp: , Rfl:     sodium,potassium,mag sulfates (Suprep) 17.5-3.13-1.6 gram solution, Take 1 bottle by mouth see administration instructions., Disp: 354 mL, Rfl: 0    turmeric/turmeric ext/pepr ext (turmeric-turmeric ext-pepper) 900-100-5 mg capsule, Take by mouth., Disp: , Rfl:

## 2025-05-15 NOTE — DISCHARGE INSTRUCTIONS

## 2025-05-15 NOTE — ANESTHESIA POSTPROCEDURE EVALUATION
Patient: Darrius Castano    Procedure Summary       Date: 05/15/25 Room / Location: [facility]    Anesthesia Start: 1311 Anesthesia Stop: 1339    Procedure: COLONOSCOPY Diagnosis: Colon cancer screening    Scheduled Providers: Dane Wellington DO; Ron Nash MD Responsible Provider: Ron Nash MD    Anesthesia Type: MAC ASA Status: 2            Anesthesia Type: MAC    Vitals Value Taken Time   /61 05/15/25 13:39   Temp Afeb 05/15/25 13:46   Pulse 65 05/15/25 13:46   Resp 16 05/15/25 13:46   SpO2 99 % 05/15/25 13:46   Vitals shown include unfiled device data.    Anesthesia Post Evaluation    Patient location during evaluation: bedside  Patient participation: complete - patient cannot participate  Level of consciousness: awake  Pain score: 0  Pain management: adequate  Airway patency: patent  Cardiovascular status: acceptable and hemodynamically stable  Respiratory status: acceptable and nonlabored ventilation  Hydration status: acceptable  Postoperative Nausea and Vomiting: none        No notable events documented.

## 2025-07-18 ENCOUNTER — TELEPHONE (OUTPATIENT)
Facility: CLINIC | Age: 65
End: 2025-07-18
Payer: COMMERCIAL

## 2025-07-18 DIAGNOSIS — E78.5 HYPERLIPIDEMIA, UNSPECIFIED HYPERLIPIDEMIA TYPE: Primary | ICD-10-CM

## 2025-07-18 RX ORDER — ATORVASTATIN CALCIUM 20 MG/1
20 TABLET, FILM COATED ORAL DAILY
Qty: 90 TABLET | Refills: 2 | Status: SHIPPED | OUTPATIENT
Start: 2025-07-18 | End: 2026-04-14

## 2025-07-28 ENCOUNTER — APPOINTMENT (OUTPATIENT)
Dept: GASTROENTEROLOGY | Facility: HOSPITAL | Age: 65
End: 2025-07-28
Payer: COMMERCIAL

## 2025-08-15 ENCOUNTER — TELEPHONE (OUTPATIENT)
Facility: CLINIC | Age: 65
End: 2025-08-15
Payer: MEDICARE

## 2025-08-15 DIAGNOSIS — E78.9 LIPID DISORDER: Primary | ICD-10-CM

## 2025-08-16 DIAGNOSIS — E78.9 LIPID DISORDER: ICD-10-CM

## 2025-08-18 ENCOUNTER — APPOINTMENT (OUTPATIENT)
Dept: PRIMARY CARE | Facility: CLINIC | Age: 65
End: 2025-08-18
Payer: MEDICARE

## 2025-08-18 VITALS
BODY MASS INDEX: 23.35 KG/M2 | HEART RATE: 66 BPM | WEIGHT: 177 LBS | DIASTOLIC BLOOD PRESSURE: 78 MMHG | SYSTOLIC BLOOD PRESSURE: 116 MMHG | OXYGEN SATURATION: 96 %

## 2025-08-18 DIAGNOSIS — K40.20 NON-RECURRENT BILATERAL INGUINAL HERNIA WITHOUT OBSTRUCTION OR GANGRENE: ICD-10-CM

## 2025-08-18 DIAGNOSIS — E78.5 HYPERLIPIDEMIA, UNSPECIFIED HYPERLIPIDEMIA TYPE: Primary | ICD-10-CM

## 2025-08-18 PROCEDURE — 99214 OFFICE O/P EST MOD 30 MIN: CPT | Performed by: INTERNAL MEDICINE

## 2025-08-19 LAB
CHOLEST SERPL-MCNC: 116 MG/DL
CHOLEST/HDLC SERPL: 2.8 (CALC)
HDLC SERPL-MCNC: 42 MG/DL
LDLC SERPL CALC-MCNC: 60 MG/DL (CALC)
NONHDLC SERPL-MCNC: 74 MG/DL (CALC)
TRIGL SERPL-MCNC: 62 MG/DL